# Patient Record
(demographics unavailable — no encounter records)

---

## 2025-06-23 NOTE — DATA REVIEWED
[de-identified] : Type A tymps, AU. Normal hearing 250 - 8000Hz, AU. Recs: 1) F/u w/ MD 2) Further tests & recs as per MD

## 2025-06-23 NOTE — HISTORY OF PRESENT ILLNESS
[de-identified] : c/o problem hearing right ear - started approx 2 weeks ago ago.  Occ discomfort in ear -  bothered by loud sounds.  Feels pressure.  Did have occ problems with balance - one month ago which now cleared.  Does wear retainer - for braces.  Does use qtips.  Also has occ high pitched ringing in right ear

## 2025-06-23 NOTE — REVIEW OF SYSTEMS
[Ear Pain] : ear pain [Hearing Loss] : hearing loss [Dizziness] : dizziness [Vertigo] : vertigo [Ear Drainage] : ear drainage [Ear Noises] : ear noises [Negative] : Heme/Lymph [de-identified] : right ear issues , off balance , occ ringing

## 2025-06-23 NOTE — ASSESSMENT
[FreeTextEntry1] : Patietn here with concerns about possible right hearing loss - exam normal  - audio and tymp normal - feel likely ETD and recommended floanse and azelastine.  Followup as needed and recommended yearly audio